# Patient Record
Sex: FEMALE | Race: WHITE | NOT HISPANIC OR LATINO | Employment: OTHER | URBAN - METROPOLITAN AREA
[De-identification: names, ages, dates, MRNs, and addresses within clinical notes are randomized per-mention and may not be internally consistent; named-entity substitution may affect disease eponyms.]

---

## 2018-09-20 ENCOUNTER — ESTABLISHED COMPREHENSIVE EXAM (OUTPATIENT)
Dept: URBAN - METROPOLITAN AREA CLINIC 36 | Facility: CLINIC | Age: 78
End: 2018-09-20

## 2018-09-20 DIAGNOSIS — H26.491: ICD-10-CM

## 2018-09-20 DIAGNOSIS — H26.492: ICD-10-CM

## 2018-09-20 DIAGNOSIS — H04.123: ICD-10-CM

## 2018-09-20 PROCEDURE — 1036F TOBACCO NON-USER: CPT

## 2018-09-20 PROCEDURE — G8427 DOCREV CUR MEDS BY ELIG CLIN: HCPCS

## 2018-09-20 PROCEDURE — G9903 PT SCRN TBCO ID AS NON USER: HCPCS

## 2018-09-20 PROCEDURE — 92015 DETERMINE REFRACTIVE STATE: CPT

## 2018-09-20 PROCEDURE — 92014 COMPRE OPH EXAM EST PT 1/>: CPT

## 2018-09-20 ASSESSMENT — VISUAL ACUITY
OS_SC: 20/40+2
OD_SC: J3
OS_CC: J2
OS_CC: 20/25+2
OD_CC: J1
OS_SC: J3
OD_SC: 20/50+2
OD_CC: 20/40

## 2018-09-20 ASSESSMENT — TONOMETRY
OS_IOP_MMHG: 14
OD_IOP_MMHG: 12

## 2019-09-17 ENCOUNTER — ESTABLISHED COMPREHENSIVE EXAM (OUTPATIENT)
Dept: URBAN - METROPOLITAN AREA CLINIC 36 | Facility: CLINIC | Age: 79
End: 2019-09-17

## 2019-09-17 DIAGNOSIS — H52.7: ICD-10-CM

## 2019-09-17 DIAGNOSIS — H04.123: ICD-10-CM

## 2019-09-17 DIAGNOSIS — H26.491: ICD-10-CM

## 2019-09-17 DIAGNOSIS — H26.492: ICD-10-CM

## 2019-09-17 PROCEDURE — 92015 DETERMINE REFRACTIVE STATE: CPT

## 2019-09-17 PROCEDURE — 92014 COMPRE OPH EXAM EST PT 1/>: CPT

## 2019-09-17 ASSESSMENT — VISUAL ACUITY
OD_SC: 20/50-1
OS_SC: J2
OD_CC: 20/25
OD_SC: J2
OS_CC: 20/25
OD_CC: J1
OS_CC: J1
OS_SC: 20/40-1

## 2019-09-17 ASSESSMENT — TONOMETRY
OS_IOP_MMHG: 16
OD_IOP_MMHG: 16

## 2020-09-24 ENCOUNTER — ESTABLISHED COMPREHENSIVE EXAM (OUTPATIENT)
Dept: URBAN - METROPOLITAN AREA CLINIC 36 | Facility: CLINIC | Age: 80
End: 2020-09-24

## 2020-09-24 DIAGNOSIS — H26.491: ICD-10-CM

## 2020-09-24 DIAGNOSIS — H04.123: ICD-10-CM

## 2020-09-24 DIAGNOSIS — H26.492: ICD-10-CM

## 2020-09-24 DIAGNOSIS — H52.7: ICD-10-CM

## 2020-09-24 PROCEDURE — 92014 COMPRE OPH EXAM EST PT 1/>: CPT

## 2020-09-24 PROCEDURE — 92015 DETERMINE REFRACTIVE STATE: CPT

## 2020-09-24 ASSESSMENT — TONOMETRY
OD_IOP_MMHG: 14
OS_IOP_MMHG: 16

## 2020-09-24 ASSESSMENT — VISUAL ACUITY
OD_CC: J2
OD_CC: 20/20
OS_CC: 20/20-1
OD_SC: 20/50-1
OD_SC: J3
OS_SC: 20/25
OS_SC: J3
OS_CC: J1

## 2022-04-26 ENCOUNTER — COMPREHENSIVE EXAM (OUTPATIENT)
Dept: URBAN - METROPOLITAN AREA CLINIC 36 | Facility: CLINIC | Age: 82
End: 2022-04-26

## 2022-04-26 DIAGNOSIS — H26.493: ICD-10-CM

## 2022-04-26 DIAGNOSIS — H04.123: ICD-10-CM

## 2022-04-26 DIAGNOSIS — H52.7: ICD-10-CM

## 2022-04-26 PROCEDURE — 92015 DETERMINE REFRACTIVE STATE: CPT

## 2022-04-26 PROCEDURE — 92014 COMPRE OPH EXAM EST PT 1/>: CPT

## 2022-04-26 ASSESSMENT — VISUAL ACUITY
OD_SC: J3
OD_PH: 20/30-1
OS_SC: 20/25-1
OD_SC: 20/60+2
OS_SC: J3

## 2022-04-26 ASSESSMENT — TONOMETRY
OD_IOP_MMHG: 16
OS_IOP_MMHG: 18

## 2024-04-24 ENCOUNTER — HOSPITAL ENCOUNTER (EMERGENCY)
Facility: HOSPITAL | Age: 84
Discharge: HOME | End: 2024-04-24
Attending: EMERGENCY MEDICINE
Payer: MEDICARE

## 2024-04-24 ENCOUNTER — APPOINTMENT (OUTPATIENT)
Dept: CT IMAGING | Facility: HOSPITAL | Age: 84
End: 2024-04-24
Payer: MEDICARE

## 2024-04-24 VITALS
DIASTOLIC BLOOD PRESSURE: 68 MMHG | HEIGHT: 62 IN | RESPIRATION RATE: 18 BRPM | SYSTOLIC BLOOD PRESSURE: 128 MMHG | WEIGHT: 130 LBS | TEMPERATURE: 98.3 F | HEART RATE: 90 BPM | OXYGEN SATURATION: 94 % | BODY MASS INDEX: 23.92 KG/M2

## 2024-04-24 DIAGNOSIS — S01.01XA LACERATION OF SCALP, INITIAL ENCOUNTER: Primary | ICD-10-CM
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
## 2024-04-24 LAB
QT INTERVAL: 320 MS
QTC INTERVAL: 417 MS

## 2024-04-24 PROCEDURE — 25010000002 TETANUS-DIPHTH-ACELL PERTUSSIS 5-2.5-18.5 LF-MCG/0.5 SUSPENSION PREFILLED SYRINGE: Performed by: EMERGENCY MEDICINE

## 2024-04-24 PROCEDURE — 70450 CT HEAD/BRAIN W/O DYE: CPT

## 2024-04-24 PROCEDURE — 90715 TDAP VACCINE 7 YRS/> IM: CPT | Performed by: EMERGENCY MEDICINE

## 2024-04-24 PROCEDURE — 99284 EMERGENCY DEPT VISIT MOD MDM: CPT

## 2024-04-24 PROCEDURE — 90471 IMMUNIZATION ADMIN: CPT | Performed by: EMERGENCY MEDICINE

## 2024-04-24 PROCEDURE — 93005 ELECTROCARDIOGRAM TRACING: CPT | Performed by: EMERGENCY MEDICINE

## 2024-04-24 RX ADMIN — Medication 3 ML: at 22:21

## 2024-04-24 RX ADMIN — TETANUS TOXOID, REDUCED DIPHTHERIA TOXOID AND ACELLULAR PERTUSSIS VACCINE, ADSORBED 0.5 ML: 5; 2.5; 8; 8; 2.5 SUSPENSION INTRAMUSCULAR at 22:20

## 2024-04-25 NOTE — DISCHARGE INSTRUCTIONS
Staple removal in 10 days.    We have placed a dressing on your scalp.  Keep it on for 24 hours if possible.    We have updated your tetanus status by injection prior to discharge.
